# Patient Record
Sex: FEMALE | Race: BLACK OR AFRICAN AMERICAN | NOT HISPANIC OR LATINO | Employment: STUDENT | URBAN - METROPOLITAN AREA
[De-identification: names, ages, dates, MRNs, and addresses within clinical notes are randomized per-mention and may not be internally consistent; named-entity substitution may affect disease eponyms.]

---

## 2023-06-07 ENCOUNTER — HOSPITAL ENCOUNTER (EMERGENCY)
Facility: HOSPITAL | Age: 23
Discharge: PSYCHIATRIC OR SUBSTANCE USE FACILITY | End: 2023-06-08
Attending: EMERGENCY MEDICINE
Payer: MEDICAID

## 2023-06-07 DIAGNOSIS — R31.9 URINARY TRACT INFECTION WITH HEMATURIA, SITE UNSPECIFIED: ICD-10-CM

## 2023-06-07 DIAGNOSIS — R44.0 AUDITORY HALLUCINATIONS: Primary | ICD-10-CM

## 2023-06-07 DIAGNOSIS — N39.0 URINARY TRACT INFECTION WITH HEMATURIA, SITE UNSPECIFIED: ICD-10-CM

## 2023-06-07 LAB
B-HCG UR QL: NEGATIVE
CTP QC/QA: YES

## 2023-06-07 PROCEDURE — 80307 DRUG TEST PRSMV CHEM ANLYZR: CPT | Performed by: STUDENT IN AN ORGANIZED HEALTH CARE EDUCATION/TRAINING PROGRAM

## 2023-06-07 PROCEDURE — 99285 EMERGENCY DEPT VISIT HI MDM: CPT

## 2023-06-07 PROCEDURE — 86803 HEPATITIS C AB TEST: CPT | Performed by: PHYSICIAN ASSISTANT

## 2023-06-07 PROCEDURE — 99284 EMERGENCY DEPT VISIT MOD MDM: CPT | Mod: ,,, | Performed by: EMERGENCY MEDICINE

## 2023-06-07 PROCEDURE — 80053 COMPREHEN METABOLIC PANEL: CPT | Performed by: STUDENT IN AN ORGANIZED HEALTH CARE EDUCATION/TRAINING PROGRAM

## 2023-06-07 PROCEDURE — 81025 URINE PREGNANCY TEST: CPT | Performed by: STUDENT IN AN ORGANIZED HEALTH CARE EDUCATION/TRAINING PROGRAM

## 2023-06-07 PROCEDURE — 81001 URINALYSIS AUTO W/SCOPE: CPT | Mod: 59 | Performed by: STUDENT IN AN ORGANIZED HEALTH CARE EDUCATION/TRAINING PROGRAM

## 2023-06-07 PROCEDURE — 87389 HIV-1 AG W/HIV-1&-2 AB AG IA: CPT | Performed by: PHYSICIAN ASSISTANT

## 2023-06-07 PROCEDURE — 85025 COMPLETE CBC W/AUTO DIFF WBC: CPT | Performed by: STUDENT IN AN ORGANIZED HEALTH CARE EDUCATION/TRAINING PROGRAM

## 2023-06-07 PROCEDURE — 82077 ASSAY SPEC XCP UR&BREATH IA: CPT | Performed by: STUDENT IN AN ORGANIZED HEALTH CARE EDUCATION/TRAINING PROGRAM

## 2023-06-07 PROCEDURE — 80143 DRUG ASSAY ACETAMINOPHEN: CPT | Performed by: STUDENT IN AN ORGANIZED HEALTH CARE EDUCATION/TRAINING PROGRAM

## 2023-06-07 PROCEDURE — 87086 URINE CULTURE/COLONY COUNT: CPT | Performed by: STUDENT IN AN ORGANIZED HEALTH CARE EDUCATION/TRAINING PROGRAM

## 2023-06-07 PROCEDURE — 99284 PR EMERGENCY DEPT VISIT,LEVEL IV: ICD-10-PCS | Mod: ,,, | Performed by: EMERGENCY MEDICINE

## 2023-06-07 PROCEDURE — 84443 ASSAY THYROID STIM HORMONE: CPT | Performed by: STUDENT IN AN ORGANIZED HEALTH CARE EDUCATION/TRAINING PROGRAM

## 2023-06-08 VITALS
SYSTOLIC BLOOD PRESSURE: 114 MMHG | HEART RATE: 77 BPM | OXYGEN SATURATION: 99 % | TEMPERATURE: 99 F | RESPIRATION RATE: 16 BRPM | DIASTOLIC BLOOD PRESSURE: 75 MMHG

## 2023-06-08 LAB
ALBUMIN SERPL BCP-MCNC: 4 G/DL (ref 3.5–5.2)
ALP SERPL-CCNC: 73 U/L (ref 55–135)
ALT SERPL W/O P-5'-P-CCNC: 16 U/L (ref 10–44)
AMPHET+METHAMPHET UR QL: NEGATIVE
ANION GAP SERPL CALC-SCNC: 6 MMOL/L (ref 8–16)
APAP SERPL-MCNC: <3 UG/ML (ref 10–20)
AST SERPL-CCNC: 24 U/L (ref 10–40)
BARBITURATES UR QL SCN>200 NG/ML: NEGATIVE
BASOPHILS # BLD AUTO: 0.04 K/UL (ref 0–0.2)
BASOPHILS NFR BLD: 0.9 % (ref 0–1.9)
BENZODIAZ UR QL SCN>200 NG/ML: NEGATIVE
BILIRUB SERPL-MCNC: 0.3 MG/DL (ref 0.1–1)
BILIRUB UR QL STRIP: NEGATIVE
BUN SERPL-MCNC: 9 MG/DL (ref 6–20)
BZE UR QL SCN: NEGATIVE
CALCIUM SERPL-MCNC: 9.4 MG/DL (ref 8.7–10.5)
CANNABINOIDS UR QL SCN: NEGATIVE
CAOX CRY UR QL COMP ASSIST: ABNORMAL
CHLORIDE SERPL-SCNC: 109 MMOL/L (ref 95–110)
CLARITY UR REFRACT.AUTO: CLEAR
CO2 SERPL-SCNC: 23 MMOL/L (ref 23–29)
COLOR UR AUTO: YELLOW
CREAT SERPL-MCNC: 0.9 MG/DL (ref 0.5–1.4)
CREAT UR-MCNC: 294 MG/DL (ref 15–325)
DIFFERENTIAL METHOD: ABNORMAL
EOSINOPHIL # BLD AUTO: 0.3 K/UL (ref 0–0.5)
EOSINOPHIL NFR BLD: 5.4 % (ref 0–8)
ERYTHROCYTE [DISTWIDTH] IN BLOOD BY AUTOMATED COUNT: 12.6 % (ref 11.5–14.5)
EST. GFR  (NO RACE VARIABLE): >60 ML/MIN/1.73 M^2
ETHANOL SERPL-MCNC: <10 MG/DL
GLUCOSE SERPL-MCNC: 95 MG/DL (ref 70–110)
GLUCOSE UR QL STRIP: NEGATIVE
HCT VFR BLD AUTO: 36.5 % (ref 37–48.5)
HCV AB SERPL QL IA: NORMAL
HGB BLD-MCNC: 12.2 G/DL (ref 12–16)
HGB UR QL STRIP: ABNORMAL
HIV 1+2 AB+HIV1 P24 AG SERPL QL IA: NORMAL
IMM GRANULOCYTES # BLD AUTO: 0.01 K/UL (ref 0–0.04)
IMM GRANULOCYTES NFR BLD AUTO: 0.2 % (ref 0–0.5)
KETONES UR QL STRIP: ABNORMAL
LEUKOCYTE ESTERASE UR QL STRIP: ABNORMAL
LYMPHOCYTES # BLD AUTO: 1.5 K/UL (ref 1–4.8)
LYMPHOCYTES NFR BLD: 33.2 % (ref 18–48)
MCH RBC QN AUTO: 28.4 PG (ref 27–31)
MCHC RBC AUTO-ENTMCNC: 33.4 G/DL (ref 32–36)
MCV RBC AUTO: 85 FL (ref 82–98)
METHADONE UR QL SCN>300 NG/ML: NEGATIVE
MICROSCOPIC COMMENT: ABNORMAL
MONOCYTES # BLD AUTO: 0.6 K/UL (ref 0.3–1)
MONOCYTES NFR BLD: 12.3 % (ref 4–15)
NEUTROPHILS # BLD AUTO: 2.2 K/UL (ref 1.8–7.7)
NEUTROPHILS NFR BLD: 48 % (ref 38–73)
NITRITE UR QL STRIP: NEGATIVE
NRBC BLD-RTO: 0 /100 WBC
OPIATES UR QL SCN: NEGATIVE
PCP UR QL SCN>25 NG/ML: NEGATIVE
PH UR STRIP: 6 [PH] (ref 5–8)
PLATELET # BLD AUTO: 250 K/UL (ref 150–450)
PMV BLD AUTO: 10.1 FL (ref 9.2–12.9)
POTASSIUM SERPL-SCNC: 3.3 MMOL/L (ref 3.5–5.1)
PROT SERPL-MCNC: 6.7 G/DL (ref 6–8.4)
PROT UR QL STRIP: NEGATIVE
RBC # BLD AUTO: 4.3 M/UL (ref 4–5.4)
RBC #/AREA URNS AUTO: 52 /HPF (ref 0–4)
SODIUM SERPL-SCNC: 138 MMOL/L (ref 136–145)
SP GR UR STRIP: 1.02 (ref 1–1.03)
SQUAMOUS #/AREA URNS AUTO: 2 /HPF
TOXICOLOGY INFORMATION: NORMAL
TSH SERPL DL<=0.005 MIU/L-ACNC: 0.78 UIU/ML (ref 0.4–4)
URN SPEC COLLECT METH UR: ABNORMAL
WBC # BLD AUTO: 4.64 K/UL (ref 3.9–12.7)
WBC #/AREA URNS AUTO: 11 /HPF (ref 0–5)

## 2023-06-08 RX ORDER — CEPHALEXIN 500 MG/1
500 CAPSULE ORAL 2 TIMES DAILY
Qty: 10 CAPSULE | Refills: 0 | Status: ON HOLD | OUTPATIENT
Start: 2023-06-08 | End: 2023-06-12 | Stop reason: HOSPADM

## 2023-06-08 NOTE — ED NOTES
Pt provided turkey sandwiches, apple juice, crackers, and pudding. Pt currently eating/drinking. No complaints at this time.

## 2023-06-08 NOTE — ED NOTES
Pt wanded by security upon arrival to the ED. Pt changed into blue scrubs and yellow socks per hospital policy. Belongings removed from pt and pt's room, collected, inventoried, and placed in safe near charge nurse and belongings closet as follows:    Closet:  1 tan purse  1 can of body spray  1 bottle of lotion  1 tube of toothpaste  5 sanitary pads  1 pack of wipes  1 [employer] wallet (empty)  1 pack of gum  3 tubes of lip balm  2 box cutters (for pt's job)    Security Envelope  Black iPhone  1 purple wallet with keys attached  2 's licenses  6 debit cards  1 parking pass  1 [employer] student ID card   28 cents

## 2023-06-08 NOTE — ED NOTES
The patient departed via w/c escorted by the EDT & hospital security.  Her bag of belongings & the valuable envelope was given to SPD staff. She departed w/o any complaints.

## 2023-06-08 NOTE — ED NOTES
"The patient is escorted to room # 26 by the EDT &  hospital security officers. She is attired in [facility] provided scrubs w/ good grooming & hygiene. Her affect is constricted, mood is "angry, very angry." Patient was engaging & states the reason for her presentation is, " my momma, my momma is the reason, playing on my intelligence." She states that her mother has done this before by having her brought to the hospital. She states in reference to her mother, she's the one that's crazy." She denies S/HI, A/VH. She endorses being "stressed." She states that her job is stressful, she's trying to move out on her own & being "23" is stressful & she states "the outside noise if you know what I mean." She states that she was diagnosed w/ "depression." She denies depression @ present but endorses "being stressed out." Supportive environment provided, POC explained. She was provided patient rights but declined to sign the patient rights form. DVC is maintained for safety.  "

## 2023-06-08 NOTE — ED NOTES
"Patient remains awake in bed, she continues to decline to have the lights dimmed. She is engaging & less defensive @ this time. She states in reference to AH, " not intentionally" when asked about talking to herself. She states the cause may be "insomnia." She endorses the use of OTC Melatonin when she needs a good nite rest. She states that she was prescribed an antidepressant for anxiety but stopped taking the medicine b/c it caused her to gain weight. DVC maintained for safety.  "

## 2023-06-08 NOTE — ED NOTES
"The patient is awake in bed. She states, " I'm okay, I just wanna' go home." She remains calm. She is void of any complaints. Sitter is maintaining DVC for safety.  "

## 2023-06-08 NOTE — ED NOTES
"In bed awake stating that she wants to "go home." DVC is maintained for safety. Awaiting transfer to [facility].  "

## 2023-06-08 NOTE — ED PROVIDER NOTES
Encounter Date: 6/7/2023       History     Chief Complaint   Patient presents with    Hallucinations     Per patient's mother patient is seeing and speaking to people that are not there. Patient denies AV hallucinations, suicidality, homicidality, somatic complaints.     23-year-old female with history of anxiety presenting to the ED with auditory hallucinations.  Patient reports that she has no complaints, that she is fine.  However, mom reports that patient has been stressed out and overwhelmed over the past couple of months.  She is had auditory hallucinations, talking with someone who is not there.  Voices are telling her that she is not going to be successful, telling her to get pregnant.  Mom reports that she is not been complaining of any SI, HI.  Auditory hallucinations have not been telling her to harm herself.  Does not take any medications.  Has had a prior psychiatric hospitalization due to similar symptoms.  Patient refused therapy.        Review of patient's allergies indicates:  No Known Allergies  No past medical history on file.  No past surgical history on file.  No family history on file.  Social History     Tobacco Use    Smoking status: Some Days     Types: Vaping with nicotine    Smokeless tobacco: Never   Substance Use Topics    Alcohol use: Not Currently    Drug use: Not Currently     Review of Systems    Physical Exam     Initial Vitals [06/07/23 2320]   BP Pulse Resp Temp SpO2   (!) 111/53 72 16 98.9 °F (37.2 °C) 97 %      MAP       --         Physical Exam    Nursing note and vitals reviewed.  Constitutional: She appears well-developed and well-nourished. She is not diaphoretic. No distress.   HENT:   Head: Normocephalic and atraumatic.   Eyes: Conjunctivae and EOM are normal. Right eye exhibits no discharge. Left eye exhibits no discharge. No scleral icterus.   Neck:   Normal range of motion.  Cardiovascular:  Normal rate and regular rhythm.     Exam reveals no gallop and no friction rub.        No murmur heard.  Pulmonary/Chest: No respiratory distress. She has no wheezes. She has no rhonchi. She has no rales. She exhibits no tenderness.   Abdominal: Abdomen is soft. She exhibits no distension and no mass. There is no abdominal tenderness. There is no rebound and no guarding.   Musculoskeletal:      Cervical back: Normal range of motion.     Neurological: She is alert and oriented to person, place, and time.   Skin: Skin is warm and dry. No erythema. No pallor.   Psychiatric:   Not making eye contact, frustrated, normal thought content and process.  Denies any SI, HI, AVH       ED Course   Procedures  Labs Reviewed   CBC W/ AUTO DIFFERENTIAL - Abnormal; Notable for the following components:       Result Value    Hematocrit 36.5 (*)     All other components within normal limits    Narrative:     Release to patient->Immediate   COMPREHENSIVE METABOLIC PANEL - Abnormal; Notable for the following components:    Potassium 3.3 (*)     Anion Gap 6 (*)     All other components within normal limits    Narrative:     Release to patient->Immediate   URINALYSIS, REFLEX TO URINE CULTURE - Abnormal; Notable for the following components:    Ketones, UA Trace (*)     Occult Blood UA 2+ (*)     Leukocytes, UA 2+ (*)     All other components within normal limits    Narrative:     Specimen Source->Urine   ACETAMINOPHEN LEVEL - Abnormal; Notable for the following components:    Acetaminophen (Tylenol), Serum <3.0 (*)     All other components within normal limits    Narrative:     Release to patient->Immediate   URINALYSIS MICROSCOPIC - Abnormal; Notable for the following components:    RBC, UA 52 (*)     WBC, UA 11 (*)     All other components within normal limits    Narrative:     Specimen Source->Urine   CULTURE, URINE   HIV 1 / 2 ANTIBODY    Narrative:     Release to patient->Immediate   HEPATITIS C ANTIBODY    Narrative:     Release to patient->Immediate   TSH    Narrative:     Release to patient->Immediate   DRUG  SCREEN PANEL, URINE EMERGENCY    Narrative:     Specimen Source->Urine   ALCOHOL,MEDICAL (ETHANOL)    Narrative:     Release to patient->Immediate   POCT URINE PREGNANCY          Imaging Results    None          Medications - No data to display  Medical Decision Making:   History:   Old Medical Records: I decided to obtain old medical records.  Initial Assessment:   Emergent evaluation 23-year-old female presenting to the ED with auditory hallucinations.    Differential includes is not limited to psychiatric illness, hypoglycemia, hypothyroidism, electrolyte abnormalities, UTI, polysubstance abuse.    Patient PEC'd for grave disability. CBC showed no leukocytosis or anemia. CMP showed no electrolyte abnormalities concerning for hospitalization. UA showed concern for UTI. UDS negative. Given prescription for Keflex for UTI.  Ethanol level negative.  TSH within normal limits.  Patient cleared for psychiatric placement to facility.                Medically cleared for psychiatry placement: 6/8/2023  1:03 AM         Clinical Impression:   Final diagnoses:  [R44.0] Auditory hallucinations (Primary)  [N39.0, R31.9] Urinary tract infection with hematuria, site unspecified        ED Disposition Condition    Transfer to Psych Facility Stable          ED Prescriptions       Medication Sig Dispense Start Date End Date Auth. Provider    cephALEXin (KEFLEX) 500 MG capsule Take 1 capsule (500 mg total) by mouth 2 (two) times a day. for 5 days 10 capsule 6/8/2023 6/13/2023 Conrado Saini MD          Follow-up Information    None          Conrado Saini MD  Resident  06/08/23 0107

## 2023-06-09 LAB
BACTERIA UR CULT: NORMAL
BACTERIA UR CULT: NORMAL

## 2023-06-14 ENCOUNTER — PATIENT OUTREACH (OUTPATIENT)
Dept: ADMINISTRATIVE | Facility: CLINIC | Age: 23
End: 2023-06-14
Payer: MEDICAID

## 2023-06-14 NOTE — PROGRESS NOTES
C3 nurse attempted to contact Leydi Martino for a TCC post hospital discharge follow up call. No answer. Left message on voicemail to return call to Brenton @ [phone].  The patient does not have a scheduled HOSFU appointment, and the pt does not have a PCP.

## 2023-06-15 NOTE — PROGRESS NOTES
2nd attempt to make TCC Call. Left voicemail. Please call [phone] leave your first and last name and date of birth for Brenton. I will return your call.

## 2023-06-15 NOTE — PROGRESS NOTES
3rd attempt for TCC Call; Left voicemail. Please call [phone] please leave first and last name and  for Brenton. We will return your call.

## 2023-09-27 ENCOUNTER — NURSE TRIAGE (OUTPATIENT)
Dept: ADMINISTRATIVE | Facility: CLINIC | Age: 23
End: 2023-09-27

## 2023-09-28 NOTE — TELEPHONE ENCOUNTER
Pt's mom reports the pt has been experiencing auditory hallucinations. She has been seen multiple times in the hospital setting during the past few months. She has an admission to the inpatient unit during the summer. Mom reports that the pt's symptoms have only worsened since being home. Pt will be yelling and arguing with voices during the night. She has been violent with her mother a few times; not now. Care advice is to call 911; mom does not wish to do so. She is looking for long term treatment options, but the patient refuses to see an outpatient provider.     Reason for Disposition   Seeing, hearing, or feeling things that are not there (i.e., visual, auditory, or tactile hallucinations)    Additional Information   Negative: [1] Difficult to awaken or acting confused (e.g., disoriented, slurred speech) AND [2] present now AND [3] diabetes mellitus   Negative: [1] Difficult to awaken or acting confused (e.g., disoriented, slurred speech) AND [2] present now AND [3] new-onset   Negative: [1] Weakness of the face, arm, or leg on one side of the body AND [2] new-onset   Negative: [1] Numbness of the face, arm, or leg on one side of the body AND [2] new-onset   Negative: [1] Loss of speech or garbled speech AND [2] new-onset   Negative: Difficulty breathing or bluish lips   Negative: Shock suspected (e.g., cold/pale/clammy skin, too weak to stand, low BP, rapid pulse)    Protocols used: Confusion - Delirium-A-AH

## 2024-07-14 PROBLEM — Z13.9 ENCOUNTER FOR MEDICAL SCREENING EXAMINATION: Status: ACTIVE | Noted: 2024-07-14

## 2024-07-14 PROBLEM — S93.401A SPRAIN OF RIGHT ANKLE: Status: ACTIVE | Noted: 2024-07-14

## 2024-07-14 PROBLEM — S61.511A LACERATION OF RIGHT WRIST: Status: ACTIVE | Noted: 2024-07-14

## 2024-07-20 PROBLEM — F29 PSYCHOSIS: Status: ACTIVE | Noted: 2024-07-20

## 2024-09-18 ENCOUNTER — OFFICE VISIT (OUTPATIENT)
Dept: OBSTETRICS AND GYNECOLOGY | Facility: CLINIC | Age: 24
End: 2024-09-18
Payer: MEDICAID

## 2024-09-18 VITALS
HEIGHT: 69 IN | WEIGHT: 154.75 LBS | BODY MASS INDEX: 22.92 KG/M2 | DIASTOLIC BLOOD PRESSURE: 71 MMHG | SYSTOLIC BLOOD PRESSURE: 138 MMHG

## 2024-09-18 DIAGNOSIS — N92.0 MENORRHAGIA WITH REGULAR CYCLE: ICD-10-CM

## 2024-09-18 DIAGNOSIS — R10.2 PELVIC PAIN IN FEMALE: Primary | ICD-10-CM

## 2024-09-18 DIAGNOSIS — R10.9 ABDOMINAL PAIN, UNSPECIFIED ABDOMINAL LOCATION: ICD-10-CM

## 2024-09-18 DIAGNOSIS — N89.8 VAGINAL ODOR: ICD-10-CM

## 2024-09-18 DIAGNOSIS — Z11.3 SCREENING FOR STDS (SEXUALLY TRANSMITTED DISEASES): ICD-10-CM

## 2024-09-18 LAB
B-HCG UR QL: NEGATIVE
CTP QC/QA: YES

## 2024-09-18 PROCEDURE — 99999 PR PBB SHADOW E&M-EST. PATIENT-LVL III: CPT | Mod: PBBFAC,,, | Performed by: OBSTETRICS & GYNECOLOGY

## 2024-09-18 PROCEDURE — 87591 N.GONORRHOEAE DNA AMP PROB: CPT | Performed by: OBSTETRICS & GYNECOLOGY

## 2024-09-18 PROCEDURE — 99213 OFFICE O/P EST LOW 20 MIN: CPT | Mod: PBBFAC,PN | Performed by: OBSTETRICS & GYNECOLOGY

## 2024-09-18 PROCEDURE — 99203 OFFICE O/P NEW LOW 30 MIN: CPT | Mod: S$PBB,,, | Performed by: OBSTETRICS & GYNECOLOGY

## 2024-09-18 PROCEDURE — 87086 URINE CULTURE/COLONY COUNT: CPT | Performed by: OBSTETRICS & GYNECOLOGY

## 2024-09-18 PROCEDURE — 81514 NFCT DS BV&VAGINITIS DNA ALG: CPT | Performed by: OBSTETRICS & GYNECOLOGY

## 2024-09-18 PROCEDURE — 99999PBSHW POCT URINE PREGNANCY: Mod: PBBFAC,,,

## 2024-09-18 PROCEDURE — 81025 URINE PREGNANCY TEST: CPT | Mod: PBBFAC,PN | Performed by: OBSTETRICS & GYNECOLOGY

## 2024-09-18 PROCEDURE — 87491 CHLMYD TRACH DNA AMP PROBE: CPT | Performed by: OBSTETRICS & GYNECOLOGY

## 2024-09-18 RX ORDER — ARIPIPRAZOLE 10 MG/1
1 TABLET ORAL DAILY
COMMUNITY

## 2024-09-18 RX ORDER — ESCITALOPRAM OXALATE 10 MG/1
1 TABLET ORAL DAILY
COMMUNITY

## 2024-09-18 RX ORDER — DIVALPROEX SODIUM 250 MG/1
1 TABLET, DELAYED RELEASE ORAL 2 TIMES DAILY
COMMUNITY

## 2024-09-18 NOTE — PROGRESS NOTES
"Subjective     Patient ID: Leydi Martino is a 24 y.o. female.    Chief Complaint:  Pelvic Pain and Metrorrhagia      History of Present Illness  HPI  24 y.o.  presents with c/o pelvic pain and heavy cycles. Cycles are regular, monthly, last 7 days - 3-4 days of pinkish blood then heavy and painful.  Passing blood clots at times.  Also with pelvic pain even between cycles - worse with lifting, walking, eating.  BM 2x/day, but hard with straining.  No dysuria.  She is sexually active, overall without complaints, occ uncomfortable.  She was started on Depo at age 19 to help with cycles - would still have a few cycles and still heavy.  Not currently on any contraception.  She also reports vaginal discharge, no itch/irritation.   No other complaints today.     GYN & OB History  Patient's last menstrual period was 2024 (exact date).   Date of Last Pap: No result found    OB History    Para Term  AB Living   1       1     SAB IAB Ectopic Multiple Live Births   1              # Outcome Date GA Lbr William/2nd Weight Sex Type Anes PTL Lv   1 SAB 2024               Review of Systems  Review of Systems   Constitutional:  Negative for chills and fever.   Respiratory:  Negative for shortness of breath.    Cardiovascular:  Negative for chest pain.   Gastrointestinal:  Positive for constipation (frequent BM, but hard with straining). Negative for abdominal pain and diarrhea.   Genitourinary:  Positive for menstrual problem, pelvic pain and vaginal discharge. Negative for dyspareunia.   Musculoskeletal:  Negative for myalgias.   Neurological:  Positive for headaches.          Objective   Physical Exam    /71   Ht 5' 9" (1.753 m)   Wt 70.2 kg (154 lb 12.2 oz)   LMP 2024 (Exact Date)   BMI 22.85 kg/m²     UPT neg    Gen: NAD  Resp: Normal respiratory effort  Abd: soft, NT/ND  Pelvic: Normal-appearing external female genitalia.  No CMT.  Uterus small, mobile, nontender.  No adnexal masses or " tenderness.    Ext: normal ROM  Psych: appropriate affect  Neuro: grossly intact         Assessment and Plan     Leydi was seen today for pelvic pain and metrorrhagia.    Diagnoses and all orders for this visit:    Pelvic pain in female  -     US Pelvis Comp with Transvag NON-OB (xpd; Future  -     POCT Urine Pregnancy  -     CULTURE, URINE    Vaginal odor  -     C. trachomatis/N. gonorrhoeae by AMP DNA [facility]; Cervix  -     Vaginosis Screen by DNA Probe; Future  -     Vaginosis Screen by DNA Probe    Abdominal pain, unspecified abdominal location  -     Ambulatory referral/consult to Gastroenterology; Future    Menorrhagia with regular cycle  -     CBC W/ AUTO DIFFERENTIAL; Future  -     TSH; Future    Screening for STDs (sexually transmitted diseases)  -     HIV 1/2 Ag/Ab (4th Gen); Future  -     Treponema Pallidium Antibodies IgG, IgM; Future  -     C. trachomatis/N. gonorrhoeae by AMP DNA [facility]; Cervix  -     Vaginosis Screen by DNA Probe; Future  -     Vaginosis Screen by DNA Probe          Plan:   Discussed cycles with patient - will draw CBC and TSH, as well as get pelvic US. Discussed starting birth control to help regulate or skip cycles, declines for now. Will await results.   STD testing today, with vaginosis due to vaginal discharge and pelvic pain.   UPT neg, Urine cx pending.   Referral to GI due to pain and frequent BM.   Counseling done, precautions given, all questions answered.  RTC for f/u results and annual.

## 2024-09-19 LAB
BACTERIAL VAGINOSIS DNA: NEGATIVE
CANDIDA GLABRATA DNA: NEGATIVE
CANDIDA KRUSEI DNA: NEGATIVE
CANDIDA RRNA VAG QL PROBE: NEGATIVE
T VAGINALIS RRNA GENITAL QL PROBE: NEGATIVE

## 2024-09-20 LAB
BACTERIA UR CULT: NORMAL
BACTERIA UR CULT: NORMAL
C TRACH DNA SPEC QL NAA+PROBE: NOT DETECTED
N GONORRHOEA DNA SPEC QL NAA+PROBE: NOT DETECTED

## 2024-10-14 PROBLEM — Z13.9 ENCOUNTER FOR MEDICAL SCREENING EXAMINATION: Status: RESOLVED | Noted: 2024-07-14 | Resolved: 2024-10-14

## 2024-12-04 PROBLEM — F41.9 ANXIETY: Status: ACTIVE | Noted: 2024-12-04

## 2024-12-04 PROBLEM — R82.90 ABNORMAL URINALYSIS: Status: ACTIVE | Noted: 2024-12-04

## 2024-12-04 PROBLEM — F31.2 BIPOLAR AFFECTIVE DISORDER, CURRENT EPISODE MANIC WITH PSYCHOTIC SYMPTOMS: Status: ACTIVE | Noted: 2024-12-04

## 2024-12-04 PROBLEM — Z91.018 FOOD ALLERGY: Status: ACTIVE | Noted: 2024-12-04

## 2024-12-23 ENCOUNTER — TELEPHONE (OUTPATIENT)
Dept: PSYCHOLOGY | Facility: CLINIC | Age: 24
End: 2024-12-23
Payer: MEDICAID

## 2025-05-13 DIAGNOSIS — N92.1 EXCESSIVE, FREQUENT AND IRREGULAR MENSTRUATION: Primary | ICD-10-CM

## 2025-05-26 ENCOUNTER — PATIENT OUTREACH (OUTPATIENT)
Dept: ADMINISTRATIVE | Facility: OTHER | Age: 25
End: 2025-05-26
Payer: MEDICAID

## 2025-05-26 NOTE — PROGRESS NOTES
CHW - Outreach Attempt    Community Health Worker left a voicemail message for 1st attempt to contact patient regardinst attempt. Spoke with the pt, unavailable at the time.   Community Health Worker to attempt to contact patient on:    [phone]

## 2025-09-05 ENCOUNTER — OFFICE VISIT (OUTPATIENT)
Dept: OBSTETRICS AND GYNECOLOGY | Facility: CLINIC | Age: 25
End: 2025-09-05
Payer: MEDICAID

## 2025-09-05 VITALS
WEIGHT: 164.13 LBS | BODY MASS INDEX: 24.31 KG/M2 | SYSTOLIC BLOOD PRESSURE: 157 MMHG | DIASTOLIC BLOOD PRESSURE: 74 MMHG | HEIGHT: 69 IN | HEART RATE: 95 BPM

## 2025-09-05 DIAGNOSIS — R10.2 PELVIC PAIN IN FEMALE: ICD-10-CM

## 2025-09-05 DIAGNOSIS — N92.1 MENOMETRORRHAGIA: ICD-10-CM

## 2025-09-05 DIAGNOSIS — N92.6 IRREGULAR PERIODS: Primary | ICD-10-CM

## 2025-09-05 LAB
B-HCG UR QL: NEGATIVE
CTP QC/QA: YES

## 2025-09-05 PROCEDURE — 99213 OFFICE O/P EST LOW 20 MIN: CPT | Mod: PBBFAC,PN

## 2025-09-05 PROCEDURE — 99999 PR PBB SHADOW E&M-EST. PATIENT-LVL III: CPT | Mod: PBBFAC,,,

## 2025-09-05 RX ORDER — IBUPROFEN 600 MG/1
600 TABLET, FILM COATED ORAL EVERY 6 HOURS PRN
Qty: 60 TABLET | Refills: 2 | Status: SHIPPED | OUTPATIENT
Start: 2025-09-05 | End: 2026-09-05